# Patient Record
Sex: FEMALE | Race: WHITE | Employment: OTHER | URBAN - METROPOLITAN AREA
[De-identification: names, ages, dates, MRNs, and addresses within clinical notes are randomized per-mention and may not be internally consistent; named-entity substitution may affect disease eponyms.]

---

## 2017-02-02 ENCOUNTER — OFFICE VISIT (OUTPATIENT)
Dept: FAMILY MEDICINE CLINIC | Facility: CLINIC | Age: 47
End: 2017-02-02

## 2017-02-02 ENCOUNTER — LAB ENCOUNTER (OUTPATIENT)
Dept: LAB | Age: 47
End: 2017-02-02
Attending: INTERNAL MEDICINE
Payer: COMMERCIAL

## 2017-02-02 VITALS
RESPIRATION RATE: 14 BRPM | DIASTOLIC BLOOD PRESSURE: 68 MMHG | HEIGHT: 67 IN | BODY MASS INDEX: 26.68 KG/M2 | TEMPERATURE: 98 F | WEIGHT: 170 LBS | HEART RATE: 66 BPM | SYSTOLIC BLOOD PRESSURE: 120 MMHG

## 2017-02-02 DIAGNOSIS — E04.9 GOITER: ICD-10-CM

## 2017-02-02 DIAGNOSIS — Z00.00 ROUTINE GENERAL MEDICAL EXAMINATION AT A HEALTH CARE FACILITY: Primary | ICD-10-CM

## 2017-02-02 DIAGNOSIS — M54.50 CHRONIC BILATERAL LOW BACK PAIN WITHOUT SCIATICA: ICD-10-CM

## 2017-02-02 DIAGNOSIS — Z00.00 ROUTINE GENERAL MEDICAL EXAMINATION AT A HEALTH CARE FACILITY: ICD-10-CM

## 2017-02-02 DIAGNOSIS — G89.29 CHRONIC BILATERAL LOW BACK PAIN WITHOUT SCIATICA: ICD-10-CM

## 2017-02-02 DIAGNOSIS — L82.1 SEBORRHEIC KERATOSES: ICD-10-CM

## 2017-02-02 LAB
ALBUMIN SERPL-MCNC: 3.9 G/DL (ref 3.5–4.8)
ALP LIVER SERPL-CCNC: 85 U/L (ref 37–98)
ALT SERPL-CCNC: 41 U/L (ref 14–54)
AST SERPL-CCNC: 74 U/L (ref 15–41)
BASOPHILS # BLD AUTO: 0.04 X10(3) UL (ref 0–0.1)
BASOPHILS NFR BLD AUTO: 0.5 %
BILIRUB SERPL-MCNC: 0.6 MG/DL (ref 0.1–2)
BILIRUB UR QL STRIP.AUTO: NEGATIVE
BUN BLD-MCNC: 12 MG/DL (ref 8–20)
CALCIUM BLD-MCNC: 8.7 MG/DL (ref 8.3–10.3)
CHLORIDE: 105 MMOL/L (ref 101–111)
CHOLEST SMN-MCNC: 155 MG/DL (ref ?–200)
CO2: 30 MMOL/L (ref 22–32)
COLOR UR AUTO: YELLOW
CREAT BLD-MCNC: 0.77 MG/DL (ref 0.55–1.02)
EOSINOPHIL # BLD AUTO: 0.08 X10(3) UL (ref 0–0.3)
EOSINOPHIL NFR BLD AUTO: 0.9 %
ERYTHROCYTE [DISTWIDTH] IN BLOOD BY AUTOMATED COUNT: 12.8 % (ref 11.5–16)
GLUCOSE BLD-MCNC: 84 MG/DL (ref 70–99)
GLUCOSE UR STRIP.AUTO-MCNC: NEGATIVE MG/DL
HCT VFR BLD AUTO: 43 % (ref 34–50)
HDLC SERPL-MCNC: 48 MG/DL (ref 45–?)
HDLC SERPL: 3.23 {RATIO} (ref ?–4.44)
HGB BLD-MCNC: 13.7 G/DL (ref 12–16)
IMMATURE GRANULOCYTE COUNT: 0.03 X10(3) UL (ref 0–1)
IMMATURE GRANULOCYTE RATIO %: 0.3 %
KETONES UR STRIP.AUTO-MCNC: NEGATIVE MG/DL
LDLC SERPL CALC-MCNC: 63 MG/DL (ref ?–130)
LEUKOCYTE ESTERASE UR QL STRIP.AUTO: NEGATIVE
LYMPHOCYTES # BLD AUTO: 2.91 X10(3) UL (ref 0.9–4)
LYMPHOCYTES NFR BLD AUTO: 33.6 %
M PROTEIN MFR SERPL ELPH: 7.4 G/DL (ref 6.1–8.3)
MCH RBC QN AUTO: 28.7 PG (ref 27–33.2)
MCHC RBC AUTO-ENTMCNC: 31.9 G/DL (ref 31–37)
MCV RBC AUTO: 90.1 FL (ref 81–100)
MONOCYTES # BLD AUTO: 0.6 X10(3) UL (ref 0.1–0.6)
MONOCYTES NFR BLD AUTO: 6.9 %
NEUTROPHIL ABS PRELIM: 4.99 X10 (3) UL (ref 1.3–6.7)
NEUTROPHILS # BLD AUTO: 4.99 X10(3) UL (ref 1.3–6.7)
NEUTROPHILS NFR BLD AUTO: 57.8 %
NITRITE UR QL STRIP.AUTO: NEGATIVE
NONHDLC SERPL-MCNC: 107 MG/DL (ref ?–130)
PH UR STRIP.AUTO: 7 [PH] (ref 4.5–8)
PLATELET # BLD AUTO: 290 10(3)UL (ref 150–450)
POTASSIUM SERPL-SCNC: 4.4 MMOL/L (ref 3.6–5.1)
PROT UR STRIP.AUTO-MCNC: NEGATIVE MG/DL
RBC # BLD AUTO: 4.77 X10(6)UL (ref 3.8–5.1)
RBC UR QL AUTO: NEGATIVE
RED CELL DISTRIBUTION WIDTH-SD: 42.5 FL (ref 35.1–46.3)
SODIUM SERPL-SCNC: 139 MMOL/L (ref 136–144)
SP GR UR STRIP.AUTO: 1.02 (ref 1–1.03)
TRIGLYCERIDES: 222 MG/DL (ref ?–150)
TSI SER-ACNC: 1.05 MIU/ML (ref 0.35–5.5)
UROBILINOGEN UR STRIP.AUTO-MCNC: <2 MG/DL
VLDL: 44 MG/DL (ref 5–40)
WBC # BLD AUTO: 8.7 X10(3) UL (ref 4–13)

## 2017-02-02 PROCEDURE — 80053 COMPREHEN METABOLIC PANEL: CPT

## 2017-02-02 PROCEDURE — 99396 PREV VISIT EST AGE 40-64: CPT | Performed by: INTERNAL MEDICINE

## 2017-02-02 PROCEDURE — G0438 PPPS, INITIAL VISIT: HCPCS | Performed by: INTERNAL MEDICINE

## 2017-02-02 PROCEDURE — 80061 LIPID PANEL: CPT

## 2017-02-02 PROCEDURE — 81003 URINALYSIS AUTO W/O SCOPE: CPT

## 2017-02-02 PROCEDURE — 84443 ASSAY THYROID STIM HORMONE: CPT

## 2017-02-02 PROCEDURE — 85025 COMPLETE CBC W/AUTO DIFF WBC: CPT

## 2017-02-02 PROCEDURE — 36415 COLL VENOUS BLD VENIPUNCTURE: CPT

## 2017-02-02 RX ORDER — TRAMADOL HYDROCHLORIDE 50 MG/1
50 TABLET ORAL EVERY 8 HOURS PRN
Qty: 90 TABLET | Refills: 0 | Status: SHIPPED | OUTPATIENT
Start: 2017-02-02 | End: 2017-12-08

## 2017-02-02 NOTE — PROGRESS NOTES
Wellness Exam    REASON FOR VISIT:    Anola Barthel is a 40year old female who presents for an [address]     Current Complaints: moles, back pain  Flu shot: declined   Health Maintenance Topics with due status: Overdue       Topic Date Due risk No results found for: CHLAMYDIA    Colonoscopy Screen Every 10 years There are no preventive care reminders to display for this patient. Flex Sigmoidoscopy Screen  Every 5 years No results found for this or any previous visit.     Fecal Occult Blood • Krystlejenny Greet Mother      hypoglyciema   • Cancer Maternal Grandmother      breast   • Breast Cancer Maternal Grandmother 54      SOCIAL HISTORY:     Smoking Status: Never Smoker                      Alcohol Use: Yes                Comment: ilon range of motion. She exhibits no edema. Lymphadenopathy:    She has no cervical adenopathy or supraclavicular adenopathy. Neurological: She is alert and oriented to person, place, and time. She has normal reflexes. Skin: Skin is warm. No rash noted. Consults:  DERM - INTERNAL  US THYROID (BTR=[id number])    Her 5 year prevention plan includes: annual visits for fasting labs  Influenza Vaccine(1) due on 09/01/2016  Annual Depression Screen due on 10/01/2016  Annual Physical due on 10/01/2016  Mammogram,1 Yr

## 2017-02-02 NOTE — PATIENT INSTRUCTIONS
Thank you for choosing Kavya Almanzar MD at [place] [address]  To Do: Gabe Litten  1. Seborrheic keratosis- benign but think about derm eval see dr Arianne Ware  2.  Ultrasound thyroid Call central scheduling [phone] to schedule your test.  Most tests Building (For example: CT scans, X rays, Ultrasound, MRI)  •Cardiac Testing in ER building Building A second floor Cardiac Testing [phone] (For example: Holter Monitor, Cardiac Stress tests,Event Monitor, or 2D Echocardiograms)  •[facility] be due for a follow-up appointment. We cannot refill your maintenance medications at a preventative wellness visit. To best provide you care, patients receiving maintenance medications need to be seen at least twice a year. Brandi Roman

## 2017-02-06 ENCOUNTER — TELEPHONE (OUTPATIENT)
Dept: FAMILY MEDICINE CLINIC | Facility: CLINIC | Age: 47
End: 2017-02-06

## 2017-02-06 ENCOUNTER — HOSPITAL ENCOUNTER (OUTPATIENT)
Dept: ULTRASOUND IMAGING | Age: 47
Discharge: HOME | End: 2017-02-06
Attending: INTERNAL MEDICINE
Payer: COMMERCIAL

## 2017-02-06 DIAGNOSIS — E04.9 GOITER: ICD-10-CM

## 2017-02-06 DIAGNOSIS — E04.1 THYROID NODULE: Primary | ICD-10-CM

## 2017-02-06 PROCEDURE — 76536 US EXAM OF HEAD AND NECK: CPT

## 2017-02-08 ENCOUNTER — TELEPHONE (OUTPATIENT)
Dept: FAMILY MEDICINE CLINIC | Facility: CLINIC | Age: 47
End: 2017-02-08

## 2017-02-08 NOTE — TELEPHONE ENCOUNTER
Referral Request   Received:  Today       Bartolo Montes [facility] Staff       Cc: P [facility] Referral Pool       Phone Number: [phone]                     .Reason for the order/referral:Thyroid Issue   PCP:  Franki   Refer to Provider: Wilma May

## 2017-02-10 ENCOUNTER — PATIENT MESSAGE (OUTPATIENT)
Dept: FAMILY MEDICINE CLINIC | Facility: CLINIC | Age: 47
End: 2017-02-10

## 2017-02-10 DIAGNOSIS — E04.1 THYROID NODULE: Primary | ICD-10-CM

## 2017-02-14 ENCOUNTER — HOSPITAL ENCOUNTER (OUTPATIENT)
Dept: ULTRASOUND IMAGING | Facility: HOSPITAL | Age: 47
Discharge: HOME | End: 2017-02-14
Attending: OTOLARYNGOLOGY
Payer: COMMERCIAL

## 2017-02-14 DIAGNOSIS — E04.1 NONTOXIC UNINODULAR GOITER: ICD-10-CM

## 2017-02-14 PROCEDURE — 76942 ECHO GUIDE FOR BIOPSY: CPT

## 2017-02-14 PROCEDURE — 10022 US FNA THYROID SH(CPT=10022/76942): CPT

## 2017-02-14 PROCEDURE — 88173 CYTOPATH EVAL FNA REPORT: CPT | Performed by: OTOLARYNGOLOGY

## 2017-02-14 NOTE — TELEPHONE ENCOUNTER
From: Jorge Gottlieb  To: Kim Danielle MD  Sent: 2/10/2017 12:59 PM CST  Subject: Visit Follow-up Question    I saw Dr. Jennifer Nassar on 2/8 and he ordered a Ultra Sound Thyroid FNA to be done at the hospital. This is scheduled for 2/14/17.  He asked for anothe

## 2017-03-31 ENCOUNTER — TELEPHONE (OUTPATIENT)
Dept: FAMILY MEDICINE CLINIC | Facility: CLINIC | Age: 47
End: 2017-03-31

## 2017-03-31 NOTE — TELEPHONE ENCOUNTER
Patient reports:  · Elbow pain for 1 month when straightening there is a shooting pain down to fingers  · Ice and Ibuprofen, tramadol not helping  · Pain 7-8/10 shooting pain, constant   · No numbness or tingling  · If she makes a fist and than extends fin

## 2017-03-31 NOTE — TELEPHONE ENCOUNTER
Appointment scheduled for patient. Patient states she is in a lot of pain and would like to know what to do. States it to the point where she is throwing up, advised patient to go to immediate care of the ER to be further evaluated.  Patient verbalized unde

## 2017-03-31 NOTE — TELEPHONE ENCOUNTER
Pt sts she has been having pain in her elbow x 1 month, no injury she is aware of. Pt wants to know if Dr Cherelle Martel would want to see her or if she should see speciality?

## 2017-04-03 ENCOUNTER — OFFICE VISIT (OUTPATIENT)
Dept: FAMILY MEDICINE CLINIC | Facility: CLINIC | Age: 47
End: 2017-04-03

## 2017-04-03 VITALS
DIASTOLIC BLOOD PRESSURE: 72 MMHG | HEIGHT: 67 IN | BODY MASS INDEX: 26.08 KG/M2 | RESPIRATION RATE: 16 BRPM | WEIGHT: 166.19 LBS | TEMPERATURE: 98 F | SYSTOLIC BLOOD PRESSURE: 116 MMHG | HEART RATE: 76 BPM

## 2017-04-03 DIAGNOSIS — M77.11 LATERAL EPICONDYLITIS, RIGHT ELBOW: Primary | ICD-10-CM

## 2017-04-03 DIAGNOSIS — S46.911A STRAIN OF UPPER ARM, RIGHT, INITIAL ENCOUNTER: ICD-10-CM

## 2017-04-03 PROCEDURE — 99213 OFFICE O/P EST LOW 20 MIN: CPT | Performed by: NURSE PRACTITIONER

## 2017-04-03 RX ORDER — NAPROXEN SODIUM 220 MG
TABLET ORAL 2 TIMES DAILY
COMMUNITY
End: 2017-12-08

## 2017-04-03 NOTE — PROGRESS NOTES
[facility] Internal Medicine Office Note  Chief Complaint:   Patient presents with:  Arm Pain: right arm pain x 1 month. Used to lift weights.       HPI:   This is a 40year old female coming in for  Arm Pain   The pain is present in the right arm Naproxen Sodium (ALEVE) 220 MG Oral Tab Take by mouth 2 (two) times daily. Disp:  Rfl:    TraMADol HCl 50 MG Oral Tab Take 1 tablet (50 mg total) by mouth every 8 (eight) hours as needed for Pain.  Disp: 90 tablet Rfl: 0         REVIEW OF SYSTEMS:   Revie Meds & Refills for this Visit:    Pending Prescriptions Disp Refills    Diclofenac Epolamine (FLECTOR) 1.3 % Transdermal Patch 1 patch 0     Sig: Apply 1 patch topically Q12H.          Imaging & Consults:  ORTHOPEDIC - INTERNAL    Influenza Vaccine(1) d

## 2017-04-03 NOTE — PATIENT INSTRUCTIONS
Thank you for choosing TEREZA Gill at [place] [address]  To Do: Leah Medina  1. Start taking pain patches, can cut to fit  2. Continue w/ aleve, ice, massage, icy/hot lotion to affected area.   3. No heavy lifting for weights for the next 1- • Please call our office about any questions regarding your treatment/medicines/tests as a result of today's visit.  For your safety, read the entire package insert of all medicines prescribed to you and be aware of all of the risks of treatment even beyon · Keep the hurt area raised to reduce pain and swelling. This is especially important during the first 48 hours. · Apply an ice pack over the injured area for 15 to 20 minutes every 3 to 6 hours.  You should do this for the first 24 to 48 hours. You can ma

## 2017-04-06 PROBLEM — M77.11 RIGHT TENNIS ELBOW: Status: ACTIVE | Noted: 2017-04-06

## 2017-04-24 ENCOUNTER — OFFICE VISIT (OUTPATIENT)
Dept: PHYSICAL THERAPY | Age: 47
End: 2017-04-24
Attending: ORTHOPAEDIC SURGERY
Payer: COMMERCIAL

## 2017-04-24 DIAGNOSIS — M77.11 RIGHT TENNIS ELBOW: Primary | ICD-10-CM

## 2017-04-24 PROCEDURE — 97110 THERAPEUTIC EXERCISES: CPT

## 2017-04-24 PROCEDURE — 97161 PT EVAL LOW COMPLEX 20 MIN: CPT

## 2017-04-24 NOTE — PROGRESS NOTES
UPPER EXTREMITY EVALUATION:   Referring Physician: Dr. Hall Expose  Diagnosis: right lateral epicondylitis Date of Service: 4/24/2017     PATIENT SUMMARY   Pk Chirinos is a 40year old y/o female who presents to therapy today with complaints of right elbow guillermina with Eamon Ybarra. Significant findings include none. She had shoulder pain one year ago while lifting overhead and has since stopped overhead lifting with weights.     ASSESSMENT  Patient has signs and symptoms consistent with physician diagnosis of right lateral education provided on plan for therapy, stretching, light strengthening, and heating.   Patient was instructed in and issued a HEP for: elbow extension stretching, wrist flexion and extension strengthening with light weight, and pronation and supination wit questions, please contact me at Dept: [phone]    Sincerely,  Electronically signed by therapist: Kya Encinas DPT, and Alison Murphy, SPT    [de-identified] certification required: Yes  I certify the need for these services furnished under this plan

## 2017-04-28 ENCOUNTER — APPOINTMENT (OUTPATIENT)
Dept: PHYSICAL THERAPY | Age: 47
End: 2017-04-28
Attending: ORTHOPAEDIC SURGERY
Payer: COMMERCIAL

## 2017-05-02 ENCOUNTER — OFFICE VISIT (OUTPATIENT)
Dept: PHYSICAL THERAPY | Age: 47
End: 2017-05-02
Attending: ORTHOPAEDIC SURGERY
Payer: COMMERCIAL

## 2017-05-02 DIAGNOSIS — M77.11 RIGHT TENNIS ELBOW: Primary | ICD-10-CM

## 2017-05-02 PROCEDURE — 97110 THERAPEUTIC EXERCISES: CPT

## 2017-05-02 PROCEDURE — 97140 MANUAL THERAPY 1/> REGIONS: CPT

## 2017-05-02 NOTE — PROGRESS NOTES
Dx: right lateral epicondylitis        Authorized # of Visits:  8         Next MD visit: none scheduled  Fall Risk: standard         Precautions: n/a             Subjective: Patient reports doing very well since the initial visit.  She has been doing her HE Tx#: 4/ Date: Tx#: 5/ Date: Tx#: 6/ Date: Tx#: 7/ Date:    Tx#: 8/   Arm bike x5 min         STM x8 min         Elbow extension stretch x30 sec         Wrist flexion slow eccentric 2lb 2x10         Wrist extension slow eccentric 2lb 2x10         Pro

## 2017-05-04 ENCOUNTER — APPOINTMENT (OUTPATIENT)
Dept: PHYSICAL THERAPY | Age: 47
End: 2017-05-04
Attending: ORTHOPAEDIC SURGERY
Payer: COMMERCIAL

## 2017-05-08 ENCOUNTER — TELEPHONE (OUTPATIENT)
Dept: PHYSICAL THERAPY | Age: 47
End: 2017-05-08

## 2017-05-08 ENCOUNTER — APPOINTMENT (OUTPATIENT)
Dept: PHYSICAL THERAPY | Age: 47
End: 2017-05-08
Attending: ORTHOPAEDIC SURGERY
Payer: COMMERCIAL

## 2017-05-11 ENCOUNTER — APPOINTMENT (OUTPATIENT)
Dept: PHYSICAL THERAPY | Age: 47
End: 2017-05-11
Attending: ORTHOPAEDIC SURGERY
Payer: COMMERCIAL

## 2017-09-06 ENCOUNTER — PATIENT MESSAGE (OUTPATIENT)
Dept: FAMILY MEDICINE CLINIC | Facility: CLINIC | Age: 47
End: 2017-09-06

## 2017-09-06 NOTE — TELEPHONE ENCOUNTER
Pt informed via mychart. Dwayne Martinez M.D.   Dermatology & Plastic Surgery  [address], [address]  Phone: [phone]    [facility] [address] [place] [address], [address]  Phone: [phone]

## 2017-09-06 NOTE — TELEPHONE ENCOUNTER
From: Gómez Samuel  To: Bebeto Sultana MD  Sent: 9/6/2017 10:55 AM CDT  Subject: Referral Request    I received a referral for a dermatologist during my february appointment. Can I find out who I was referred to and utilize this referral now?     Normal Or

## 2017-09-20 ENCOUNTER — TELEPHONE (OUTPATIENT)
Dept: FAMILY MEDICINE CLINIC | Facility: CLINIC | Age: 47
End: 2017-09-20

## 2017-09-20 DIAGNOSIS — L82.1 SEBORRHEIC KERATOSES: Primary | ICD-10-CM

## 2017-09-27 ENCOUNTER — PATIENT MESSAGE (OUTPATIENT)
Dept: FAMILY MEDICINE CLINIC | Facility: CLINIC | Age: 47
End: 2017-09-27

## 2017-09-27 NOTE — TELEPHONE ENCOUNTER
From: Raymundo Valencia  To: Susi Engel MD  Sent: 9/27/2017 9:23 AM CDT  Subject: Referral Request    This was the original referral, but when I called, he was no longer seeing patients.  I am seeing another provider in the practice that I will be seeing, K

## 2017-09-28 ENCOUNTER — LAB ENCOUNTER (OUTPATIENT)
Dept: DERMATOLOGY CLINIC | Age: 47
End: 2017-09-28
Attending: DERMATOLOGY
Payer: COMMERCIAL

## 2017-09-28 DIAGNOSIS — Z76.89 ENCOUNTER FOR BIOPSY: Primary | ICD-10-CM

## 2017-09-28 PROCEDURE — 88305 TISSUE EXAM BY PATHOLOGIST: CPT

## 2017-10-03 PROBLEM — C44.91 BASAL CELL CARCINOMA: Status: ACTIVE | Noted: 2017-10-03

## 2017-11-01 ENCOUNTER — TELEPHONE (OUTPATIENT)
Dept: FAMILY MEDICINE CLINIC | Facility: CLINIC | Age: 47
End: 2017-11-01

## 2017-11-01 DIAGNOSIS — Z12.31 ENCOUNTER FOR SCREENING MAMMOGRAM FOR MALIGNANT NEOPLASM OF BREAST: Primary | ICD-10-CM

## 2017-11-01 NOTE — TELEPHONE ENCOUNTER
Requesting Mammogram order  LOV: 4/3/17  RTC: none specified  Mammogram order placed- Pt aware, verbalized understanding  Last mammogram: 11/17/16    Future Appointments  Date Time Provider Heidi Dowling   12/8/2017 1:30 PM Rocio Milton MD [facility]

## 2017-11-13 ENCOUNTER — MED REC SCAN ONLY (OUTPATIENT)
Dept: FAMILY MEDICINE CLINIC | Facility: CLINIC | Age: 47
End: 2017-11-13

## 2017-11-28 ENCOUNTER — HOSPITAL ENCOUNTER (OUTPATIENT)
Dept: MAMMOGRAPHY | Age: 47
Discharge: HOME | End: 2017-11-28
Attending: INTERNAL MEDICINE
Payer: COMMERCIAL

## 2017-11-28 DIAGNOSIS — Z12.31 ENCOUNTER FOR SCREENING MAMMOGRAM FOR MALIGNANT NEOPLASM OF BREAST: ICD-10-CM

## 2017-11-28 PROCEDURE — 77067 SCR MAMMO BI INCL CAD: CPT | Performed by: INTERNAL MEDICINE

## 2017-12-08 ENCOUNTER — OFFICE VISIT (OUTPATIENT)
Dept: OBGYN CLINIC | Facility: CLINIC | Age: 47
End: 2017-12-08

## 2017-12-08 VITALS
DIASTOLIC BLOOD PRESSURE: 70 MMHG | HEIGHT: 66 IN | WEIGHT: 168 LBS | SYSTOLIC BLOOD PRESSURE: 116 MMHG | BODY MASS INDEX: 27 KG/M2

## 2017-12-08 DIAGNOSIS — Z85.41 HISTORY OF CERVICAL CANCER: ICD-10-CM

## 2017-12-08 DIAGNOSIS — Z12.39 BREAST CANCER SCREENING: ICD-10-CM

## 2017-12-08 DIAGNOSIS — Z01.419 WELL WOMAN EXAM: Primary | ICD-10-CM

## 2017-12-08 DIAGNOSIS — Z12.72 SCREENING FOR VAGINAL CANCER: ICD-10-CM

## 2017-12-08 PROCEDURE — 88175 CYTOPATH C/V AUTO FLUID REDO: CPT | Performed by: OBSTETRICS & GYNECOLOGY

## 2017-12-08 PROCEDURE — 99386 PREV VISIT NEW AGE 40-64: CPT | Performed by: OBSTETRICS & GYNECOLOGY

## 2017-12-08 RX ORDER — DAPSONE 50 MG/G
GEL TOPICAL 2 TIMES DAILY
COMMUNITY
End: 2018-10-01

## 2017-12-08 RX ORDER — MULTIVITAMIN
TABLET ORAL
COMMUNITY
End: 2018-10-01

## 2017-12-08 NOTE — PROGRESS NOTES
GYN H&P     12/8/2017  1:48 PM    CC: Patient presents with:  Physical: routine exam, PT states no concerns       HPI: patient is a 56year old [id number] here for her annual gyne exam.      Pt is here for gyne visit.  She had a vaginal hysterectomy in 2005 fo Patch Apply 1 patch topically Q12H. Disp: 1 patch Rfl: 0     No current facility-administered medications on file prior to visit.    Family History   Problem Relation Age of Onset   • Cancer Maternal Grandmother      breast   • Breast Cancer Maternal Grandm auscultation  CARDIOVASCULAR: normal S1, S2, RRR  BREASTS: soft, nontendder, no palpable masses or nodes, no nipple discharge, no skin changes, no axillary adenopathy  ABDOMEN: Soft, non distended; non tender, no masses  GYNE/:                        Ext

## 2018-10-01 ENCOUNTER — OFFICE VISIT (OUTPATIENT)
Dept: FAMILY MEDICINE CLINIC | Facility: CLINIC | Age: 48
End: 2018-10-01
Payer: COMMERCIAL

## 2018-10-01 ENCOUNTER — APPOINTMENT (OUTPATIENT)
Dept: LAB | Age: 48
End: 2018-10-01
Attending: FAMILY MEDICINE
Payer: COMMERCIAL

## 2018-10-01 VITALS
WEIGHT: 167 LBS | BODY MASS INDEX: 26.84 KG/M2 | SYSTOLIC BLOOD PRESSURE: 110 MMHG | DIASTOLIC BLOOD PRESSURE: 70 MMHG | RESPIRATION RATE: 16 BRPM | TEMPERATURE: 98 F | HEART RATE: 66 BPM | HEIGHT: 66 IN

## 2018-10-01 DIAGNOSIS — M47.816 SPONDYLOSIS OF LUMBAR REGION WITHOUT MYELOPATHY OR RADICULOPATHY: ICD-10-CM

## 2018-10-01 DIAGNOSIS — E04.1 THYROID NODULE: ICD-10-CM

## 2018-10-01 DIAGNOSIS — Z00.00 LABORATORY EXAM ORDERED AS PART OF ROUTINE GENERAL MEDICAL EXAMINATION: ICD-10-CM

## 2018-10-01 DIAGNOSIS — Z28.21 INFLUENZA VACCINATION DECLINED BY PATIENT: ICD-10-CM

## 2018-10-01 DIAGNOSIS — Z00.00 ROUTINE MEDICAL EXAM: Primary | ICD-10-CM

## 2018-10-01 DIAGNOSIS — Z12.39 SCREENING FOR BREAST CANCER: ICD-10-CM

## 2018-10-01 PROBLEM — L82.1 SEBORRHEIC KERATOSES: Status: RESOLVED | Noted: 2017-02-02 | Resolved: 2018-10-01

## 2018-10-01 PROBLEM — M77.11 RIGHT TENNIS ELBOW: Status: RESOLVED | Noted: 2017-04-06 | Resolved: 2018-10-01

## 2018-10-01 PROCEDURE — 80053 COMPREHEN METABOLIC PANEL: CPT | Performed by: FAMILY MEDICINE

## 2018-10-01 PROCEDURE — 99396 PREV VISIT EST AGE 40-64: CPT | Performed by: FAMILY MEDICINE

## 2018-10-01 PROCEDURE — 36415 COLL VENOUS BLD VENIPUNCTURE: CPT | Performed by: FAMILY MEDICINE

## 2018-10-01 PROCEDURE — 99213 OFFICE O/P EST LOW 20 MIN: CPT | Performed by: FAMILY MEDICINE

## 2018-10-01 PROCEDURE — 84443 ASSAY THYROID STIM HORMONE: CPT | Performed by: FAMILY MEDICINE

## 2018-10-01 PROCEDURE — 80061 LIPID PANEL: CPT | Performed by: FAMILY MEDICINE

## 2018-10-01 RX ORDER — METHYLPREDNISOLONE 4 MG/1
TABLET ORAL
Qty: 1 KIT | Refills: 0 | Status: SHIPPED | OUTPATIENT
Start: 2018-10-01 | End: 2019-01-03

## 2018-10-01 NOTE — PROGRESS NOTES
Patient presents with:  Physical: Former Kj Baker patient. Imm/Inj: Declined the flu vaccine today. HPI:  Erme Crew is a 53year old female here today for preventative visit. Imms- declines flu and tdap despite discussion of benefits/risks. FNA THYROID (CPT=10022/69577)      Comment:  benign    Current Outpatient Medications on File Prior to Visit:  Multiple Vitamins-Minerals (MULTIVITAL) Oral Tab Take 1 tablet by mouth daily.  Disp:  Rfl:    Calcium Carb-Cholecalciferol (CALCIUM 1000 + D OR) Onset   • Breast Cancer Maternal Grandmother 54   • Other (cervical cancer) Maternal Grandmother    • Diabetes Father    • Other (hypoglycemia) Mother    • Other (cervical cancer) Mother    • Other (kidney cancer) Maternal Grandfather        Review of Syst Influenza vaccination declined by patient  - FLULAVAL INFLUENZA VACCINE QUAD PRESERVATIVE FREE 0.5 ML    5. Thyroid nodule  - ASSAY, THYROID STIM HORMONE; Future    6.  Spondylosis of lumbar region without myelopathy or radiculopathy  - methylPREDNISolone (

## 2019-01-03 ENCOUNTER — APPOINTMENT (OUTPATIENT)
Dept: CT IMAGING | Age: 49
End: 2019-01-03
Attending: EMERGENCY MEDICINE
Payer: COMMERCIAL

## 2019-01-03 ENCOUNTER — HOSPITAL ENCOUNTER (EMERGENCY)
Age: 49
Discharge: HOME | End: 2019-01-03
Attending: EMERGENCY MEDICINE
Payer: COMMERCIAL

## 2019-01-03 VITALS
DIASTOLIC BLOOD PRESSURE: 56 MMHG | WEIGHT: 167.13 LBS | RESPIRATION RATE: 16 BRPM | HEART RATE: 78 BPM | SYSTOLIC BLOOD PRESSURE: 112 MMHG | BODY MASS INDEX: 27 KG/M2 | TEMPERATURE: 98 F | OXYGEN SATURATION: 99 %

## 2019-01-03 DIAGNOSIS — R07.89 CHEST PAIN, ATYPICAL: ICD-10-CM

## 2019-01-03 DIAGNOSIS — R10.9 ABDOMINAL PAIN OF UNKNOWN ETIOLOGY: ICD-10-CM

## 2019-01-03 DIAGNOSIS — R20.2 PARESTHESIAS: Primary | ICD-10-CM

## 2019-01-03 LAB
ALBUMIN SERPL-MCNC: 4.1 G/DL (ref 3.1–4.5)
ALBUMIN/GLOB SERPL: 1.1 {RATIO} (ref 1–2)
ALP LIVER SERPL-CCNC: 94 U/L (ref 39–100)
ALT SERPL-CCNC: 26 U/L (ref 14–54)
ANION GAP SERPL CALC-SCNC: 9 MMOL/L (ref 0–18)
AST SERPL-CCNC: 20 U/L (ref 15–41)
BASOPHILS # BLD AUTO: 0.04 X10(3) UL (ref 0–0.1)
BASOPHILS NFR BLD AUTO: 0.4 %
BILIRUB SERPL-MCNC: 0.8 MG/DL (ref 0.1–2)
BILIRUB UR QL STRIP.AUTO: NEGATIVE
BUN BLD-MCNC: 17 MG/DL (ref 8–20)
BUN/CREAT SERPL: 19.8 (ref 10–20)
CALCIUM BLD-MCNC: 9 MG/DL (ref 8.3–10.3)
CHLORIDE SERPL-SCNC: 109 MMOL/L (ref 101–111)
CLARITY UR REFRACT.AUTO: CLEAR
CO2 SERPL-SCNC: 23 MMOL/L (ref 22–32)
COLOR UR AUTO: YELLOW
CREAT BLD-MCNC: 0.86 MG/DL (ref 0.55–1.02)
EOSINOPHIL # BLD AUTO: 0.04 X10(3) UL (ref 0–0.3)
EOSINOPHIL NFR BLD AUTO: 0.4 %
ERYTHROCYTE [DISTWIDTH] IN BLOOD BY AUTOMATED COUNT: 12.3 % (ref 11.5–16)
GLOBULIN PLAS-MCNC: 3.9 G/DL (ref 2.8–4.4)
GLUCOSE BLD-MCNC: 109 MG/DL (ref 70–99)
GLUCOSE UR STRIP.AUTO-MCNC: NEGATIVE MG/DL
HCT VFR BLD AUTO: 44.5 % (ref 34–50)
HGB BLD-MCNC: 15 G/DL (ref 12–16)
IMMATURE GRANULOCYTE COUNT: 0.02 X10(3) UL (ref 0–1)
IMMATURE GRANULOCYTE RATIO %: 0.2 %
KETONES UR STRIP.AUTO-MCNC: NEGATIVE MG/DL
LEUKOCYTE ESTERASE UR QL STRIP.AUTO: NEGATIVE
LIPASE: 380 U/L (ref 73–393)
LYMPHOCYTES # BLD AUTO: 4.65 X10(3) UL (ref 0.9–4)
LYMPHOCYTES NFR BLD AUTO: 42.3 %
M PROTEIN MFR SERPL ELPH: 8 G/DL (ref 6.4–8.2)
MCH RBC QN AUTO: 28.5 PG (ref 27–33.2)
MCHC RBC AUTO-ENTMCNC: 33.7 G/DL (ref 31–37)
MCV RBC AUTO: 84.4 FL (ref 81–100)
MONOCYTES # BLD AUTO: 0.62 X10(3) UL (ref 0.1–1)
MONOCYTES NFR BLD AUTO: 5.6 %
NEUTROPHIL ABS PRELIM: 5.62 X10 (3) UL (ref 1.3–6.7)
NEUTROPHILS # BLD AUTO: 5.62 X10(3) UL (ref 1.3–6.7)
NEUTROPHILS NFR BLD AUTO: 51.1 %
NITRITE UR QL STRIP.AUTO: NEGATIVE
OSMOLALITY SERPL CALC.SUM OF ELEC: 294 MOSM/KG (ref 275–295)
PH UR STRIP.AUTO: 6.5 [PH] (ref 4.5–8)
PLATELET # BLD AUTO: 292 10(3)UL (ref 150–450)
POTASSIUM SERPL-SCNC: 3.7 MMOL/L (ref 3.6–5.1)
PROT UR STRIP.AUTO-MCNC: NEGATIVE MG/DL
RBC # BLD AUTO: 5.27 X10(6)UL (ref 3.8–5.1)
RED CELL DISTRIBUTION WIDTH-SD: 37.8 FL (ref 35.1–46.3)
SODIUM SERPL-SCNC: 141 MMOL/L (ref 136–144)
SP GR UR STRIP.AUTO: 1.01 (ref 1–1.03)
TROPONIN I SERPL-MCNC: <0.046 NG/ML (ref ?–0.05)
TROPONIN I SERPL-MCNC: <0.046 NG/ML (ref ?–0.05)
UROBILINOGEN UR STRIP.AUTO-MCNC: 0.2 MG/DL
WBC # BLD AUTO: 11 X10(3) UL (ref 4–13)

## 2019-01-03 PROCEDURE — 99285 EMERGENCY DEPT VISIT HI MDM: CPT

## 2019-01-03 PROCEDURE — 93005 ELECTROCARDIOGRAM TRACING: CPT

## 2019-01-03 PROCEDURE — 83690 ASSAY OF LIPASE: CPT | Performed by: EMERGENCY MEDICINE

## 2019-01-03 PROCEDURE — 93010 ELECTROCARDIOGRAM REPORT: CPT

## 2019-01-03 PROCEDURE — 81003 URINALYSIS AUTO W/O SCOPE: CPT | Performed by: EMERGENCY MEDICINE

## 2019-01-03 PROCEDURE — 84484 ASSAY OF TROPONIN QUANT: CPT | Performed by: EMERGENCY MEDICINE

## 2019-01-03 PROCEDURE — 96374 THER/PROPH/DIAG INJ IV PUSH: CPT

## 2019-01-03 PROCEDURE — 74177 CT ABD & PELVIS W/CONTRAST: CPT | Performed by: EMERGENCY MEDICINE

## 2019-01-03 PROCEDURE — 80053 COMPREHEN METABOLIC PANEL: CPT | Performed by: EMERGENCY MEDICINE

## 2019-01-03 PROCEDURE — 71275 CT ANGIOGRAPHY CHEST: CPT | Performed by: EMERGENCY MEDICINE

## 2019-01-03 PROCEDURE — 85025 COMPLETE CBC W/AUTO DIFF WBC: CPT | Performed by: EMERGENCY MEDICINE

## 2019-01-03 PROCEDURE — 96375 TX/PRO/DX INJ NEW DRUG ADDON: CPT

## 2019-01-03 RX ORDER — LORAZEPAM 2 MG/ML
0.5 INJECTION INTRAMUSCULAR ONCE
Status: COMPLETED | OUTPATIENT
Start: 2019-01-03 | End: 2019-01-03

## 2019-01-03 RX ORDER — ONDANSETRON 2 MG/ML
4 INJECTION INTRAMUSCULAR; INTRAVENOUS ONCE
Status: COMPLETED | OUTPATIENT
Start: 2019-01-03 | End: 2019-01-03

## 2019-01-03 RX ORDER — LORAZEPAM 0.5 MG/1
0.5 TABLET ORAL 2 TIMES DAILY PRN
Qty: 20 TABLET | Refills: 0 | Status: SHIPPED | OUTPATIENT
Start: 2019-01-03 | End: 2019-01-10

## 2019-01-03 RX ORDER — ASPIRIN 81 MG/1
324 TABLET, CHEWABLE ORAL ONCE
Status: COMPLETED | OUTPATIENT
Start: 2019-01-03 | End: 2019-01-03

## 2019-01-03 NOTE — ED PROVIDER NOTES
6605 patient reports symptoms are completely resolved. Remainder patient testing is unremarkable. CT scan of the chest abdomen and pelvis were normal.  Second EKG showed no acute ischemic changes no significant change from previous EKG.   Second set of tr

## 2019-01-03 NOTE — ED INITIAL ASSESSMENT (HPI)
Pt in er for c/o mid sternal chest pressure radiating into right arm causing tingling in right arm/hand

## 2019-01-03 NOTE — ED PROVIDER NOTES
Patient Seen in: [facility] [place]    History   Patient presents with:  Numbness Weakness (neurologic)    Stated Complaint: numbness to right arm    HPI    Patient presents with multiple complaints beginning about 30 minutes befor numbness to right arm  Other systems are as noted in HPI. Constitutional and vital signs reviewed. All other systems reviewed and negative except as noted above.     Physical Exam     ED Triage Vitals [01/03/19 0438]   /80   Pulse 91   Resp 20 DIFFERENTIAL WITH PLATELET.   Procedure                               Abnormality         Status                     ---------                               -----------         ------                     CBC W/ DIFFERENTIAL[[id number]]          Abnormal

## 2019-01-05 LAB
ATRIAL RATE: 82 BPM
ATRIAL RATE: 86 BPM
P AXIS: 34 DEGREES
P AXIS: 44 DEGREES
P-R INTERVAL: 144 MS
P-R INTERVAL: 162 MS
Q-T INTERVAL: 384 MS
Q-T INTERVAL: 416 MS
QRS DURATION: 96 MS
QRS DURATION: 98 MS
QTC CALCULATION (BEZET): 448 MS
QTC CALCULATION (BEZET): 497 MS
R AXIS: -17 DEGREES
R AXIS: -8 DEGREES
T AXIS: 13 DEGREES
T AXIS: 4 DEGREES
VENTRICULAR RATE: 82 BPM
VENTRICULAR RATE: 86 BPM

## 2019-03-08 ENCOUNTER — HOSPITAL ENCOUNTER (OUTPATIENT)
Dept: MAMMOGRAPHY | Age: 49
Discharge: HOME | End: 2019-03-08
Attending: FAMILY MEDICINE
Payer: COMMERCIAL

## 2019-03-08 DIAGNOSIS — Z12.39 SCREENING FOR BREAST CANCER: ICD-10-CM

## 2019-03-08 PROCEDURE — 77063 BREAST TOMOSYNTHESIS BI: CPT | Performed by: FAMILY MEDICINE

## 2019-03-08 PROCEDURE — 77067 SCR MAMMO BI INCL CAD: CPT | Performed by: FAMILY MEDICINE

## 2019-03-21 ENCOUNTER — HOSPITAL ENCOUNTER (OUTPATIENT)
Dept: ULTRASOUND IMAGING | Age: 49
Discharge: HOME | End: 2019-03-21
Attending: FAMILY MEDICINE
Payer: COMMERCIAL

## 2019-03-21 ENCOUNTER — HOSPITAL ENCOUNTER (OUTPATIENT)
Dept: MAMMOGRAPHY | Age: 49
Discharge: HOME | End: 2019-03-21
Attending: FAMILY MEDICINE
Payer: COMMERCIAL

## 2019-03-21 DIAGNOSIS — R92.2 INCONCLUSIVE MAMMOGRAM: ICD-10-CM

## 2019-03-21 PROCEDURE — 77065 DX MAMMO INCL CAD UNI: CPT | Performed by: FAMILY MEDICINE

## 2019-03-21 PROCEDURE — 77061 BREAST TOMOSYNTHESIS UNI: CPT | Performed by: FAMILY MEDICINE

## 2019-03-21 PROCEDURE — 76642 ULTRASOUND BREAST LIMITED: CPT | Performed by: FAMILY MEDICINE

## (undated) NOTE — ED AVS SNAPSHOT
Rich Ya   MRN: [id number]    Department:  THE [facility] Emergency Department in [place]   Date of Visit:  1/3/2019           Disclosure     Insurance plans vary and the physician(s) referred by the ER may not be covered by your plan.  Please contact tell this physician (or your personal doctor if your instructions are to return to your personal doctor) about any new or lasting problems. The primary care or specialist physician will see patients referred from the [facility] Emergency Department.  Marcos Ramey

## (undated) NOTE — MR AVS SNAPSHOT
[facility] [address]  [facility]  [phone]               Thank you for choosing us for your health care visit with TEREZA Garcia.   We are glad to serve you and happy to provide you •[facility] call [phone] usually in [address] [facility] in Clinic in [place] at [facility].  [address] Mon-Fri at 8am-7:30pm, and Sat/Sun 9am-430pm  Also at [address]  Call [phone] for info    • Please call our office about to be seen at least twice a year. .   Muscle Strain in the Extremities  A muscle strain is a stretching and tearing of muscle fibers. This causes pain, especially when you move that muscle. There may also be some swelling and bruising.   Home care  · Keep th Allergies as of Apr 03, 2017     Morphine Nausea and vomiting                Today's Vital Signs     BP Pulse Temp Height Weight BMI    116/72 mmHg 76 98 °F (36.7 °C) (Temporal) 67\" 166 lb 3.2 oz 26.02 kg/m2         Current Medications          This

## (undated) NOTE — MR AVS SNAPSHOT
[facility] [address]  [address] [facility]  [phone]               Thank you for choosing us for your health care visit with Navarro Monroe MD.  We are glad to serve you and happy to provide you with t Sat 9am-noon    [address]  [phone]  Mon, [place], Thurs 7am-630 pm  tues and Fri 7am-3pm    [address]  [phone]  mon-Fri 7am-3pm          • Please signup for MY CHART, which is electronic access to your rec healthier and to improve your quality of life.     Referrals, and Radiology Information:    If your insurance requires a referral to a specialist, please allow 5 business days to process your referral request.    If Wilfrido Islas MD orders a CT or MRI, tan ma CBC W Differential W Platelet [E]    Complete by: Feb 02, 2017 (Approximate)    Assoc Dx:  Routine general medical examination at a health care facility [Z00.00]           Comp Metabolic Panel (14) [E]    Complete by:   Feb 02, 2017 (Approximate)    Assoc your appointment immediately. However, if you are unsure about the requirements for authorization, please wait 5-7 days and then contact your physician's office.  At that time, you will be provided with any authorization numbers or be assured that none are active are less likely to develop some chronic diseases than adults who are inactive.      HOW TO GET STARTED: HOW TO STAY MOTIVATED:   Start activities slowly and build up over time Do what you like   Get your heart pumping – brisk walking, biking, swimmin

## (undated) NOTE — MR AVS SNAPSHOT
[facility] HEART AND SURGICAL [facility]  [address]  [phone]               Thank you for choosing us for your health care visit with Ishaan Styles PT.   We are glad to serve you and happy to provide you with this summ Summaries. If you've been to the Emergency Department or your doctor's office, you can view your past visit information in [email or web address] by going to Visits < Visit Summaries. [email or web address] questions? Call [phone] for help.   [email or web address] is NOT to be used for urge

## (undated) NOTE — MR AVS SNAPSHOT
[facility] HEART AND SURGICAL [facility]  [address]  [phone]               Thank you for choosing us for your health care visit with Jose Humphreys PT.   We are glad to serve you and happy to provide you with this summ view more details from this visit by going to [email or web address]. If you've recently had a stay at the Hospital you can access your discharge instructions in [employer] by going to Visits < Admission Summaries.  If you've been to the Emergency Depar